# Patient Record
Sex: MALE | Race: WHITE
[De-identification: names, ages, dates, MRNs, and addresses within clinical notes are randomized per-mention and may not be internally consistent; named-entity substitution may affect disease eponyms.]

---

## 2020-03-13 ENCOUNTER — HOSPITAL ENCOUNTER (INPATIENT)
Dept: HOSPITAL 95 - ER | Age: 40
LOS: 11 days | Discharge: HOME | DRG: 917 | End: 2020-03-24
Attending: INTERNAL MEDICINE | Admitting: INTERNAL MEDICINE
Payer: SELF-PAY

## 2020-03-13 VITALS — HEIGHT: 75 IN | BODY MASS INDEX: 22.29 KG/M2 | WEIGHT: 179.24 LBS

## 2020-03-13 DIAGNOSIS — R56.9: ICD-10-CM

## 2020-03-13 DIAGNOSIS — G92: ICD-10-CM

## 2020-03-13 DIAGNOSIS — N17.9: ICD-10-CM

## 2020-03-13 DIAGNOSIS — J96.01: ICD-10-CM

## 2020-03-13 DIAGNOSIS — E87.6: ICD-10-CM

## 2020-03-13 DIAGNOSIS — I48.20: ICD-10-CM

## 2020-03-13 DIAGNOSIS — J69.0: ICD-10-CM

## 2020-03-13 DIAGNOSIS — J90: ICD-10-CM

## 2020-03-13 DIAGNOSIS — E83.39: ICD-10-CM

## 2020-03-13 DIAGNOSIS — J93.12: ICD-10-CM

## 2020-03-13 DIAGNOSIS — M62.82: ICD-10-CM

## 2020-03-13 DIAGNOSIS — R40.20: ICD-10-CM

## 2020-03-13 DIAGNOSIS — T40.7X1A: ICD-10-CM

## 2020-03-13 DIAGNOSIS — F10.20: ICD-10-CM

## 2020-03-13 DIAGNOSIS — T43.621A: Primary | ICD-10-CM

## 2020-03-13 DIAGNOSIS — I21.A1: ICD-10-CM

## 2020-03-13 DIAGNOSIS — J96.02: ICD-10-CM

## 2020-03-13 LAB
AMPHETAMINES UR SCN-MCNC: DETECTED NG/ML
AMPHETAMINES UR-MCNC: DETECTED UG/L
BASOPHILS # BLD AUTO: 0.12 K/MM3 (ref 0–0.23)
BASOPHILS NFR BLD AUTO: 1 % (ref 0–2)
CANNABINOIDS UR QL: DETECTED
DEPRECATED RDW RBC AUTO: 46.3 FL (ref 35.1–46.3)
EOSINOPHIL # BLD AUTO: 0.29 K/MM3 (ref 0–0.68)
EOSINOPHIL NFR BLD AUTO: 2 % (ref 0–6)
ERYTHROCYTE [DISTWIDTH] IN BLOOD BY AUTOMATED COUNT: 13.7 % (ref 11.7–14.2)
HCT VFR BLD AUTO: 45.4 % (ref 37–53)
HGB BLD-MCNC: 14.2 G/DL (ref 13.5–17.5)
IMM GRANULOCYTES # BLD AUTO: 0.48 K/MM3 (ref 0–0.1)
IMM GRANULOCYTES NFR BLD AUTO: 3 % (ref 0–1)
KETONES UR STRIP-MCNC: (no result) MG/DL
LEUKOCYTE ESTERASE UR QL STRIP: (no result)
LYMPHOCYTES # BLD AUTO: 6.2 K/MM3 (ref 0.84–5.2)
LYMPHOCYTES NFR BLD AUTO: 42 % (ref 21–46)
MCHC RBC AUTO-ENTMCNC: 31.3 G/DL (ref 31.5–36.5)
MCV RBC AUTO: 91 FL (ref 80–100)
MONOCYTES # BLD AUTO: 1.02 K/MM3 (ref 0.16–1.47)
MONOCYTES NFR BLD AUTO: 7 % (ref 4–13)
NEUTROPHILS # BLD AUTO: 6.64 K/MM3 (ref 1.96–9.15)
NEUTROPHILS NFR BLD AUTO: 45 % (ref 41–73)
NRBC # BLD AUTO: 0 K/MM3 (ref 0–0.02)
NRBC BLD AUTO-RTO: 0 /100 WBC (ref 0–0.2)
PH BLDA: 6.99 [PH] (ref 7.35–7.45)
PLATELET # BLD AUTO: 371 K/MM3 (ref 150–400)
PROT UR STRIP-MCNC: (no result) MG/DL
SP GR SPEC: 1.02 (ref 1–1.02)
UROBILINOGEN UR STRIP-MCNC: (no result) MG/DL
WBC #/AREA URNS HPF: (no result) /HPF (ref 0–5)

## 2020-03-13 PROCEDURE — U0001 2019-NCOV DIAGNOSTIC P: HCPCS

## 2020-03-13 PROCEDURE — G0480 DRUG TEST DEF 1-7 CLASSES: HCPCS

## 2020-03-13 PROCEDURE — C1751 CATH, INF, PER/CENT/MIDLINE: HCPCS

## 2020-03-13 PROCEDURE — C9113 INJ PANTOPRAZOLE SODIUM, VIA: HCPCS

## 2020-03-14 LAB
ALBUMIN SERPL BCP-MCNC: 3.5 G/DL (ref 3.4–5)
ALBUMIN/GLOB SERPL: 1.1 {RATIO} (ref 0.8–1.8)
ALT SERPL W P-5'-P-CCNC: 39 U/L (ref 12–78)
ANION GAP SERPL CALCULATED.4IONS-SCNC: 14 MMOL/L (ref 6–16)
APAP SERPL-MCNC: <2 UG/ML (ref 10–30)
AST SERPL W P-5'-P-CCNC: 44 U/L (ref 12–37)
BASOPHILS # BLD AUTO: 0.05 K/MM3 (ref 0–0.23)
BASOPHILS NFR BLD AUTO: 0 % (ref 0–2)
BILIRUB SERPL-MCNC: 0.2 MG/DL (ref 0.1–1)
BUN SERPL-MCNC: 15 MG/DL (ref 8–24)
CALCIUM SERPL-MCNC: 7.6 MG/DL (ref 8.5–10.1)
CHLORIDE SERPL-SCNC: 110 MMOL/L (ref 98–108)
CK MB CFR SERPL CALC: 0.4 % (ref 0–4)
CK MB CFR SERPL CALC: 1 % (ref 0–4)
CK SERPL-CCNC: 2833 U/L (ref 39–308)
CK SERPL-CCNC: 395 U/L (ref 39–308)
CO2 SERPL-SCNC: 17 MMOL/L (ref 21–32)
CREAT SERPL-MCNC: 1.31 MG/DL (ref 0.6–1.2)
DEPRECATED RDW RBC AUTO: 45.6 FL (ref 35.1–46.3)
EOSINOPHIL # BLD AUTO: 0.12 K/MM3 (ref 0–0.68)
EOSINOPHIL NFR BLD AUTO: 1 % (ref 0–6)
ERYTHROCYTE [DISTWIDTH] IN BLOOD BY AUTOMATED COUNT: 14 % (ref 11.7–14.2)
ETHANOL SERPL-MCNC: 37 MG/DL
GLOBULIN SER CALC-MCNC: 3.1 G/DL (ref 2.2–4)
GLUCOSE SERPL-MCNC: 247 MG/DL (ref 70–99)
HCT VFR BLD AUTO: 42.1 % (ref 37–53)
HGB BLD-MCNC: 13.9 G/DL (ref 13.5–17.5)
IMM GRANULOCYTES # BLD AUTO: 0.13 K/MM3 (ref 0–0.1)
IMM GRANULOCYTES NFR BLD AUTO: 1 % (ref 0–1)
LYMPHOCYTES # BLD AUTO: 2.87 K/MM3 (ref 0.84–5.2)
LYMPHOCYTES NFR BLD AUTO: 16 % (ref 21–46)
MAGNESIUM SERPL-MCNC: 1.9 MG/DL (ref 1.6–2.4)
MCHC RBC AUTO-ENTMCNC: 33 G/DL (ref 31.5–36.5)
MCV RBC AUTO: 88 FL (ref 80–100)
MONOCYTES # BLD AUTO: 1.37 K/MM3 (ref 0.16–1.47)
MONOCYTES NFR BLD AUTO: 8 % (ref 4–13)
NEUTROPHILS # BLD AUTO: 13.22 K/MM3 (ref 1.96–9.15)
NEUTROPHILS NFR BLD AUTO: 74 % (ref 41–73)
NRBC # BLD AUTO: 0 K/MM3 (ref 0–0.02)
NRBC BLD AUTO-RTO: 0 /100 WBC (ref 0–0.2)
PH BLDV: 7.38 [PH] (ref 7.34–7.37)
PLATELET # BLD AUTO: 263 K/MM3 (ref 150–400)
POTASSIUM SERPL-SCNC: 3.6 MMOL/L (ref 3.5–5.5)
PROT SERPL-MCNC: 6.6 G/DL (ref 6.4–8.2)
SALICYLATES SERPL-MCNC: 2.9 MG/DL (ref 2.8–20)
SODIUM SERPL-SCNC: 141 MMOL/L (ref 136–145)
TROPONIN I SERPL-MCNC: 0.07 NG/ML (ref 0–0.04)
TROPONIN I SERPL-MCNC: 0.65 NG/ML (ref 0–0.04)

## 2020-03-14 PROCEDURE — 5A1955Z RESPIRATORY VENTILATION, GREATER THAN 96 CONSECUTIVE HOURS: ICD-10-PCS | Performed by: HOSPITALIST

## 2020-03-14 PROCEDURE — 0BH17EZ INSERTION OF ENDOTRACHEAL AIRWAY INTO TRACHEA, VIA NATURAL OR ARTIFICIAL OPENING: ICD-10-PCS | Performed by: HOSPITALIST

## 2020-03-14 PROCEDURE — 02HV33Z INSERTION OF INFUSION DEVICE INTO SUPERIOR VENA CAVA, PERCUTANEOUS APPROACH: ICD-10-PCS | Performed by: HOSPITALIST

## 2020-03-14 NOTE — NUR
PT REQUIRING MORE SEDATION D/T INCREASED AGITATION. PROPOFOL INCREASED FROM
65MCG TO 75MCG. FENT GIVEN Q 2-3HRS. RECTAL TEMP PROBE PLACED. 98.2 DEGREES.
BP TRENDING UP WITH SOME MILD HTN.

## 2020-03-14 NOTE — NUR
PT SEDATED ON PROPOFOL AT 55MCG FOR MECH VENT. PT PARTIALLY OPENS EYES,
SOMEWHAT AGITATED, VENT STACKING. PT DOES NOT FOLLOW DIRECTIONS.  IV W
PROPOFOL FOUND TO BE LEAKING. NEW IV STARTED. FENT 50MCG GIVEN, PROPOFOL
REMAINS AT 55MCG; PT NOW ADEQUATELY SEDATED. BP, HEART RATE/RHYTHM STABLE.
SATS 100% ON 45% FIO2. PT FOUND TO BE HYPOTHERMIC AT 96.1 VIA HANDY CATH TEMP
PROBE. WARM BLANKETS PLACED. TEMP CONT TO DECREASE DOWN TO 95.9. BEAR HUGGER
PLACED. PT HAS VERY POOR URINE OUTPUT AT APPROX 11CC/HR. WILL UPDATE DR EDGAR.

## 2020-03-14 NOTE — NUR
20G IV TO PT'S LEFT WRIST STARTED IN ER/NOT CHARTED. THIS IV WAS FOUND TO BE
LEAKING. IV DC'D INTACT. IV TO PT'S RIGHT FOOT PLACED BY ED/NOT CHARTED. THIS
IV DC'D INTACT D/T LOCATION. 2 PIV STARTED W/O DIFFICULTY; PT TOW.

## 2020-03-14 NOTE — NUR
RESTLESSNESS/AGITATION
PT RESTLESS, COUGHING, BITING AT ETT, PULLING AGAINST BILAT WRIST RESTRAINTS,
DOES NOT REDIRECT WELL AT THIS TIME, PROPOFOL GTT INCREASED TO 90 MCG/KG/MIN
AT THIS TIME, WILL MONITOR.

## 2020-03-14 NOTE — NUR
PT NEW ADMIT THIS SHIFT, REMAINS INTUBATED AND SEDATED, PROPOFOL GTT HAS BEEN
SLOWLY DECREASED TO 55 MCG/KG/MIN AS OF THIS TIME, PT IS INCREASINGLY
RESPONSIVE TO NOXIOUS STIMULI, CONTINUES WITHOUT RESPONSE TO VERBAL STIMULI,
PUPILS REMAIN AT 1 WITH BRISK REACTION BILAT. HRR, CONTINUES IN SINUS, RATE
80S, PRESSURES MAINTAINING, SKIN REMAINS PWD, NO EDEMA. RASH TO TORSO
CONTINUES WITHOUT CHANGE. LUNGS REMAIN CLEAR THROUGHOUT, SATS MAINTAIN WITH
VENT SETTINGS UNCHANGED, RATE REMAINS HIGH TEENS LOW 20S. TEMP PROBE HANDY IN
PLACE, PT AFEBRILE SINCE ARRIVAL TO ICU, CLEAR YELLOW URINE DRAINING TO
GRAVITY ALTHOUGH SMALL AMOUNT OF SEDIMENT WAS NOTED IN TUBING WITH EMPTYING
DRAINAGE BAG FOR I&O THIS AM.

## 2020-03-14 NOTE — NUR
ASSUMED CARE OF PT, BEDSIDE REPORT RECEIVED. PT IS GRIMACING, RESTLESS IN BED,
PULLING AGAINST BILAT WRIST RESTRAINTS AND COUGHING AT THIS TIME. PROPOFOL GTT
AT 75 MCG/KG/MIN, FENTANYL 100 MCG IV ADMIN BY OFFGOING RN LEXI ARRIOLA. PT
REMAINS INTUBATED WITH 8.0 ETT, 26 CM AT TEETH, VENT IS SET ON SPONTANEOUS,
RESP RATE NEAR 20 AT THIS TIME, TIDAL VOLUMES ARE 5-600, SATS ARE MID TO UPPER
90S, RT AT BEDSIDE FOR VENT CHECK. HRR, SINUS ON MONITOR WITH RATE IN THE
70-80S, PRESSURES ARE MAINTAINING AT THIS TIME, SKIN IS PWD, PEDAL PULSES ARE
FULL AND EQUAL BILAT, NO EDEMA IS NOTED. HANDY CATH IN PLACE, NO LONGER TEMP
PROBE, URINE IS DARK YELLOW IN UROMETER, APPROX 30 ML PRESENT AT THIS TIME.

## 2020-03-14 NOTE — NUR
PT ARRIVES TO ICU 4 VIA GURNEY FROM ER. HE IS SEDATED WITH PROPOFOL AT 70
MCG/KG/MIN TO LEFT FOREARM IV ACCESS VIA INFUSION PUMP, PROPOFOL IS PUT TO
STANDBY, CLAMPED, AND REMOVED FROM ER GURNEY INFUSION PUMP FOR SLIDE TRANSFER
TO BED.  SOFT RESTRAINTS ARE NOTED IN PLACE ON ARRIVAL FROM ER. ETT 8.0, 26 AT
LIP, 25 AT TEETH AT THIS TIME, RT AT BEDSIDE FOR VENT MANAGEMENT, AC 16, TV
500, FIO2 45%, PEEP 5, PT SATS ARE HIGH 90S WITH THESE SETTINGS, LUNGS ARE
CLEAR THROUGHOUT AT THIS TIME, RATE 20. HRR, SINUS ON MONITOR, RATE 80S,
PRESSURES IMPROVED FROM REPORTED AT ARRIVAL TO ER, SKIN IS PWD, BRISK CAP
REFILL, FINE RASH IS NOTED TO TORSO, NO EDEMA IS NOTED AT THIS TIME, DEFIB
PADS ARE NOTED IN PLACE, PER RT AT BEDSIDE, PT WAS SHOCKED X 2 IN ER FOR
CARDIOVERSION. HE IS NOTED TO HAVE A COUGH AND SWALLOW REFLEX WITH PROPOFOL ON
STANDBY, MINIMAL RESPONSE TO DEEP STERNAL RUB, WILL TITRATE PROPOFOL DOWN AS
TOLERATED AND MONITOR. PUPILS ARE EQUAL BILAT, 1, BRISK BUT SLIGHT REACTION,
NO EYE MOVEMENT IS NOTED AT THIS TIME. OROGASTRIC TUBE IS PLACED AND PT
TOLERATED WELL, PLACEMENT CONFIRMED WITH RETURN OF GASTRIC CONTENTS AS WELL AS
AUSCULTATION OF AIR OVER LUQ WITH 60 ML AIR FLUSH, PLACED TO LIS, BROWN/CLEAR
DRAINAGE AT THIS TIME, ABD FLAT, ACTIVE BOWEL TONES X 4, NO REACTION TO
PALPATION, ABD SOFT. TEMP PROBE HANDY IN PLACE DRAINING CLEAR YELLOW URINE TO
GRAVITY. ORAL CARE PROVIDED, BLOOD IS NOTED IN PT'S MOUTH.

## 2020-03-14 NOTE — NUR
PT WITH NO URINE OUTPUT SINCE 1130; DR EDGAR NOTIFIED. UNABLE TO FLUSH
CATHETER D/T PLUG. CATHETER DC'D AND NEW 16F COUDE PLACED W/O DIFFICULTY.
350CC YELLOW URINE OUTPUT WITHIN 5MIN. URINE SEDIMENT IMPROVED. DR EDGAR
CHANGED VENT SETTINGS TO SPONT PS 2, 40% FIO2.  PT TOLERATING WELL. PT WOKE
UP, EYES OPENED, MOUTHED WORDS, WAS UNABLE TO FOLLOW DIRECTIONS. FENT IVP
GIVEN FOR AGITATION. PT SLEEPING NOW, APPEARS COMFORTABLE. PT'S MOTHER CALLED
TO UPDATE HER AS HE HAS NO CHILDREN AND SHE IS NEXT OF KIN; TALA NOTIFIED.
PT'S COUSIN WHOM IS LISTED AS EMERGENCY CONTACT AT BEDSIDE.

## 2020-03-14 NOTE — NUR
ASSESSMENT
PT IS RESTING QUIETLY AT THIS TIME, DOES OPEN EYES WITH ORAL CARE, HOWEVER
TOLERATES WELL. SATS ARE NOTED TO BE DECREASING TO HIGH 80S WITH VENT SETTINGS
UNCHANGED. LUNG SOUNDS ARE COARSE THROUGHOUT, ETT SUCTIONED, PRODUCTIVE OF
MODERATE AMOUNT OF THICK GREEN SPUTUM, DISCUSSED LUNG SOUNDS WITH RT VIA
VOCERA, LUNG SOUNDS WERE CLEAR AT THE TIME OF BEDSIDE REPORT. PT LUNG SOUNDS
RETURN TO CLEAR POST PT COUGH AND ETT SUCTIONING, SATS RETURN TO BASELINE.
ACTIVE BOWEL TONES ARE PRESENT AT THIS TIME, ABD SOFT, NO GUARDING NOTED WITH
PALPATION. RASH TO TORSO IS IMPROVED.

## 2020-03-15 LAB
ANION GAP SERPL CALCULATED.4IONS-SCNC: 6 MMOL/L (ref 6–16)
BASOPHILS # BLD AUTO: 0.03 K/MM3 (ref 0–0.23)
BASOPHILS NFR BLD AUTO: 0 % (ref 0–2)
BUN SERPL-MCNC: 15 MG/DL (ref 8–24)
CALCIUM SERPL-MCNC: 7.9 MG/DL (ref 8.5–10.1)
CHLORIDE SERPL-SCNC: 109 MMOL/L (ref 98–108)
CK SERPL-CCNC: 8067 U/L (ref 39–308)
CO2 SERPL-SCNC: 28 MMOL/L (ref 21–32)
CREAT SERPL-MCNC: 1.17 MG/DL (ref 0.6–1.2)
DEPRECATED RDW RBC AUTO: 48.8 FL (ref 35.1–46.3)
EOSINOPHIL # BLD AUTO: 0.14 K/MM3 (ref 0–0.68)
EOSINOPHIL NFR BLD AUTO: 2 % (ref 0–6)
ERYTHROCYTE [DISTWIDTH] IN BLOOD BY AUTOMATED COUNT: 14.5 % (ref 11.7–14.2)
GLUCOSE SERPL-MCNC: 76 MG/DL (ref 70–99)
HCT VFR BLD AUTO: 43.6 % (ref 37–53)
HGB BLD-MCNC: 13.5 G/DL (ref 13.5–17.5)
IMM GRANULOCYTES # BLD AUTO: 0.01 K/MM3 (ref 0–0.1)
IMM GRANULOCYTES NFR BLD AUTO: 0 % (ref 0–1)
LYMPHOCYTES # BLD AUTO: 1.66 K/MM3 (ref 0.84–5.2)
LYMPHOCYTES NFR BLD AUTO: 20 % (ref 21–46)
MCHC RBC AUTO-ENTMCNC: 31 G/DL (ref 31.5–36.5)
MCV RBC AUTO: 91 FL (ref 80–100)
MONOCYTES # BLD AUTO: 0.34 K/MM3 (ref 0.16–1.47)
MONOCYTES NFR BLD AUTO: 4 % (ref 4–13)
NEUTROPHILS # BLD AUTO: 6.01 K/MM3 (ref 1.96–9.15)
NEUTROPHILS NFR BLD AUTO: 73 % (ref 41–73)
NRBC # BLD AUTO: 0 K/MM3 (ref 0–0.02)
NRBC BLD AUTO-RTO: 0 /100 WBC (ref 0–0.2)
PH BLDA: 7.18 [PH] (ref 7.35–7.45)
PH BLDA: 7.24 [PH] (ref 7.35–7.45)
PH BLDA: 7.4 [PH] (ref 7.35–7.45)
PH BLDV: 7.28 [PH] (ref 7.34–7.37)
PLATELET # BLD AUTO: 220 K/MM3 (ref 150–400)
POTASSIUM SERPL-SCNC: 3.6 MMOL/L (ref 3.5–5.5)
SODIUM SERPL-SCNC: 143 MMOL/L (ref 136–145)

## 2020-03-15 NOTE — NUR
PT PLACED IN PRONE POSITION AT 1600. DR EDGAR AT BEDSIDE. PT TURNED WITH 4
RN'S, 2 AT EACH SIDE, AN RT AT THE HEAD MONITORING AIRWAY AND ETT, AND A RT
STUDENT TO ASSIST. PT PRONE W/O COMPLICATIONS. HEAD TURNED TOWARDS R SIDE
FACING VENT. SATS IMMEDIATELY RAISED TO MID 90'S THEN DROPPED. DR EDGAR
RAISED PEEP UP TO 16. SATS THEN RAISED TO HIGH 90'S. PEEP NOW DOWN TO 12, FIO2
AT 98%, SATS %. PICC PLACED PRIOR TO PRONING PT. PICC PLACED W/O
DIFFICULTY.  LEVOPHED STARTED AT 1647 AT 6MCG, AND HAS BEEN TITRATED DOWN TO
3MCG; WILL CONT TO TITRATE DOWN AS TOLERATED. TEMP NOW 98.6 PER RECTAL PROBE.
BIS READING 40-50. PT IN NSR, RATE AT 85. PROPOFOL AT 40MCG, NIMBEX AT 2MCG.

## 2020-03-15 NOTE — NUR
DR EDGAR UPDATED FAMILY. DR EDGAR INCREASED PEEP TO 10, AND DECREASED FIO2
TO 80%. PT SATS TEMP INCREASED TO 90-92%, BUT THEN FELL TO 86%. RT AT BEDSIDE
FIO2 INCREASED %. PT MAY REQUIRE NIMBEX. TYLENOL PT GIVEN FOR TEMP
102.2.

## 2020-03-15 NOTE — NUR
PT'S O2 SAT HAVE BEEN TRENDING DOWN BETWEEN 82 AND 90. DR EDGAR HAS BEEN IN
THE UNIT AND AT BEDSIDE OFTEN TO EVALUATE PT. STAT CHEST XRAY COMPLETED TO
RE-ASSESS SMALL PNEUMO; NO SIGNIFICANT CHANGES IN PNEUMOTHORAX, THERE ARE
INCREASING INFILTRATES IN BASES. PEEP INCREASED TO 14, FIO2 %. PT PLACED
FLAT IN PREP FOR REPOSITIONING AND SATS INCREASED TO 87-90. LUNGS SOUNDS HAVE
NOT CHANGED FROM THIS AM. AWAITING PCR. PT ADEQUATELY PARALYZED AND SEDATED.
BIS READING 40. TO4 0/0. PT HYPOTENSIVE WITH MAPS 50'S NOW THAT PEEP HAS
INCREASED. PICC LINE TO BE PLACED. IF SATS DO NOT IMPROVE WITHIN THE HOUR DR EDGAR MAY ORDER FOR PT TO BE PRONED. VANCO STARTED.

## 2020-03-15 NOTE — NUR
UPDATE
PT REPOSITIONED FROM PRONE TO SUPINE. BED BATH GIVEN. PT HYPOTENSIVE, LEVOPHED
RESTARTED. DR EDGAR CALLED AND UPDATED TO CURRENT CONDITION AND VBG. NO NEW
ORDERS RECIEVED. WILL CONTINUE TO MONITOR.

## 2020-03-15 NOTE — NUR
LEVOPHED DECREASED TO 2MCG. PT TURNED PRONE FACING RIGHT, 4RN'S, 1RT, AND DR EDGAR ASSISTED W TURN. FIO2 DOWN TO 70%, SATS 98%.

## 2020-03-15 NOTE — NUR
PT REMAINS INTUBATED, VENT REMAINS ON SPONT WITH PEEP AT 5, FIO2 TITRATED UP
TO 60% THROUGHOUT SHIFT SECONDARY TO PT SATS DECREASING TO MID 80S, COUGH
FREQUENCY HAS INCREASED, SPUTUM PRODUCTION HAS INCREASED, COLOR LIGHTENED FROM
GREEN TO PARNELL/GREEN, LUNGS INITIALLY COARSE BUT CLEARED WITH SUCTIONING
PROGRESSED TO MAINTAINING COARSE AFTER SUCTION. SEDATION REQUIREMENTS
INCREASED PROPOFOL AT 90 MCG/KG/MIN MOST OF THIS SHIFT, ATTEMPTS TO TITRATE
DOWN TO 85 REPEATEDLY RESULTED IN PT SITTING UP AND PULLING AGAINST BILAT
WRIST RESTRAINTS, DID NOT FOLLOW DIRECTIONS, FENTANYL 100 MCG IV REQUIRED
EVERY 1-2 HOURS IN ADDITION TO PROPOFOL, DISCUSSED SEDATION REQUIREMENTS WITH
DR EDGAR THIS AM AND ORDERS RECEIVED FOR PRECEDEX AND ATIVAN AND TO KEEP
PROPOFOL LESS THAN 60 MCG/KG/MIN, PRECEDEX INFUSION STARTED AT 0.2 MCG/KG/HR
AND TITRATED TO 0.4 MCG/KG/HR PT IS TOLERATING WELL AS OF THIS TIME. TEMP
INCREASED TO MAX .4, PT HAS ICE TO BILAT AXILLA, RIGHT GROIN, AND RIGHT
CLAVICLE AS WELL AS TYLENOL ADMIN MD AT 0358 THIS AM, TEMP HAS IMPROVED TO
103.1 AS OF THIS TIME. URINE CLEARED THROUGHOUT SHIFT, WAS GREEN TINGED FROM
MIDNOC UNTIL 0500 THIS AM, HAS BEEN CLEAR YELLOW SINCE THAT TIME.

## 2020-03-15 NOTE — NUR
BP STABLE; PROPOFOL RESTARTED AT 40MCG. RESP RATE AND RESP EFFORT SLOWLY
DECREASING, SATS STARTING TO INCREASE.

## 2020-03-15 NOTE — NUR
ELEVATED TEMP
RECTAL PROBE TEMP NOTED 102.9, ICE PACK PLACED TO GROIN AND RIGHT ANTERIOR
CLAVICLE, FAN PLACED ON BEDSIDE TABLE AND SET ON HIGH. CALL PLACED TO
HOSPITALIST ON CALL REGARDING INCREASED TEMP. TYLENOL ORDERS RECEIVED.

## 2020-03-15 NOTE — NUR
ELEVATED TEMP
TEMP INCREASED .8, TYLENOL AR AVAILABLE FROM PHARMACY AND ADMIN, ADD ICE
PACKS PLACED TO AXILLA BILAT. WILL CONT TO MONITOR.

## 2020-03-15 NOTE — NUR
ABG SHOWS HYPOXIA, PT'S SATS 96%, PT USING ACCESSORY MUSCLES. DR AGUILAR
NOTIFIED. ORDERS GIVEN TO START NIMBEX GTT.

## 2020-03-15 NOTE — NUR
SPOKE WITH DR EDGAR REGARDING PROPOFOL AND FENTANYL REQUIREMENTS FOR SEDATION
THIS SHIFT, PT ELEVATED TEMP, AND SPUTUM SUCTIONED FROM ETT. ORDERS RECEIVED.
WILL INITIATE PRECEDEX ON ARRIVAL FROM PHARMACY AND TITRATE PROPOFOL PER
ORDERS.

## 2020-03-15 NOTE — NUR
ASSUMED CARE
BEDSIDE REPORT RECIEVED. PT IS INTUBATED, SEDATED, AND PARALYZED. PT IN PRONE
POSITION AT THIS TIME. VENT SETTINGS AC 20, , PEEP 12, FIO2 70%. PT WITH
COPIOUS ORAL AND ETT SECRETIONS. OGT IN PLACE TO LIS, SCANT BROWN OUTPUT
NOTED. PICC TO JUNE C/D/I. PROPOFOL INFUSING AT 40 MCG/KG/MIN INITIALLY,
TITRATED UP TO 50 MCG/KG/MIN, NIMBEX AT 2 MCG/KG/MIN, AND NS AT 75 ML/HR. BIS
MONITORING IN PLACE READING 40-60'S. TRAIN OF FOUR IS 4/4. PT FURROWED BROW
WITH ORAL SUCTION. NO SPONTANEOUS MOVEMENT OF EXTREMITIES NOTED. NO RESTRAINTS
IN PLACE. HANDY IN PLACE WITH YELLOW/SEDIMENT OUTPUT NOTED. RECTAL THERMOMITER
IN PLACE. FAMILY AT BEDSIDE UPDATED TO PLAN OF CARE AND PT CONDITION. WILL
CONTINUE TO MONITOR.

## 2020-03-15 NOTE — NUR
NIMBEX STARTED AT 2MCG/KG/MIN. PROPOFOL PLACED ON STANDBY FOR HYPOTENSION. BIS
MONITOR PLACED AND SHOWS BASELINE 70'S. TO4: 4/4. DR EDGAR AT BEDSIDE TO
CHECK ON PT. TEMP 101.8; WILL GIVE IBUPROFEN AS ORDERED.

## 2020-03-15 NOTE — NUR
PT SEDATED ON PROPOFOL AT 60MCG AND PRECEDEX AT 0.4MCG FOR MECH VENT. BEDSIDE
REPORT TAKEN. TEMP 102.2 PER RECTAL PROBE. NEW ICE PACKS PLACED TO PT, FAN ON
PT, WET TOWEL TO CHEST. PT BECAME VERY AGITATED, NOT FOLLOWING DIRECTIONS,
VENT STACKING, SATS LOW 80%. FENT/ATIVAN GIVEN W GOOD EFFECT. PROPOFOL
DECREASED TO 50MCG AND PRECEDEX INCREASED TO 0.7MCG. PT SATS IMPROVED TO HIGH
80'S. RT CALLED. PT SUCTIONED; NO SPUTUM AT THIS TIME, BUT DID HAVE COPIOUS
AMTS OF THICK YELLOW/CREAM SPUTUM LAST NOCT PER NIGHT RN. DR EDGAR CALLED;
PEEP INCREASED TO 8, PT PLACED BACK ON AC16//FIO2 %. LUNGS VERY
DIMINISHED TO BASES, COARSE TO JUSTYN. PTS SATS NOW 94-95%. PT'S MOM CALLED AND
GIVEN UPDATE.

## 2020-03-16 LAB
ANION GAP SERPL CALCULATED.4IONS-SCNC: 6 MMOL/L (ref 6–16)
BASOPHILS # BLD: 0.09 K/MM3 (ref 0–0.23)
BASOPHILS NFR BLD: 1 % (ref 0–2)
BUN SERPL-MCNC: 15 MG/DL (ref 8–24)
CALCIUM SERPL-MCNC: 7.6 MG/DL (ref 8.5–10.1)
CHLORIDE SERPL-SCNC: 110 MMOL/L (ref 98–108)
CO2 SERPL-SCNC: 25 MMOL/L (ref 21–32)
CREAT SERPL-MCNC: 0.77 MG/DL (ref 0.6–1.2)
DEPRECATED RDW RBC AUTO: 47.2 FL (ref 35.1–46.3)
EOSINOPHIL # BLD: 0 K/MM3 (ref 0–0.68)
EOSINOPHIL NFR BLD: 0 % (ref 0–6)
ERYTHROCYTE [DISTWIDTH] IN BLOOD BY AUTOMATED COUNT: 14.1 % (ref 11.7–14.2)
GLUCOSE SERPL-MCNC: 104 MG/DL (ref 70–99)
HCT VFR BLD AUTO: 43.1 % (ref 37–53)
HGB BLD-MCNC: 13.7 G/DL (ref 13.5–17.5)
LYMPHOCYTES # BLD: 1.82 K/MM3 (ref 0.84–5.2)
LYMPHOCYTES NFR BLD: 19 % (ref 21–46)
MAGNESIUM SERPL-MCNC: 1.9 MG/DL (ref 1.6–2.4)
MCHC RBC AUTO-ENTMCNC: 31.8 G/DL (ref 31.5–36.5)
MCV RBC AUTO: 90 FL (ref 80–100)
METAMYELOCYTES # BLD MANUAL: 0.09 K/MM3 (ref 0–0)
METAMYELOCYTES NFR BLD MANUAL: 1 % (ref 0–0)
MONOCYTES # BLD: 0.19 K/MM3 (ref 0.16–1.47)
MONOCYTES NFR BLD: 2 % (ref 4–13)
NEUTS BAND NFR BLD MANUAL: 42 % (ref 0–8)
NEUTS SEG # BLD MANUAL: 7.4 K/MM3 (ref 1.96–9.15)
NEUTS SEG NFR BLD MANUAL: 35 % (ref 41–73)
NRBC # BLD AUTO: 0 K/MM3 (ref 0–0.02)
NRBC BLD AUTO-RTO: 0 /100 WBC (ref 0–0.2)
PH BLDA: 7.27 [PH] (ref 7.35–7.45)
PHOSPHATE SERPL-MCNC: 3.7 MG/DL (ref 2.5–4.9)
PLATELET # BLD AUTO: 224 K/MM3 (ref 150–400)
POTASSIUM SERPL-SCNC: 4.3 MMOL/L (ref 3.5–5.5)
SODIUM SERPL-SCNC: 141 MMOL/L (ref 136–145)
TOTAL CELLS COUNTED BLD: 100

## 2020-03-16 NOTE — NUR
PATIENT INTUBATED AND SEDATED WITH VENT SET AT AC 20, , PEEP 10, FIO2
45% PROPOFOL AT 6OMCG AND NIMBEX 3MCG WITH BIS 31 AND TRAIN OF 4 4/4 TO RIGHT
EYEBROW. DURING ORAL CARE PATIENT RAISING EYEBROWS, NO OTHER MOVEMENT SEEN.
OG IN PLACE WITH NO RESIDUAL RATE INCREASED TO GOAL RATE OF 35 CC/HR PLAN TO
CONTINUE TO MONITOR FOR RESIDUALS.

## 2020-03-16 NOTE — NUR
TEMP 101.1. . /97. BIS 30 WITH NIMBEX @ 3 MCG/KG/MIN AND PROPOFOL
@ 60 MCG/KG/MIN. MED WITH FENTANYL 50 MCG IVP X 1.  PT INCONTINENT OF
STOOL-KIP CARE AND PARTIAL LINEN CHANGE COMPLETED.

## 2020-03-16 NOTE — NUR
SHIFT SUMMARY
NO ACUTE CHANGES THIS SHIFT. PT REMAINS INTUBATED, SEDATED, AND PARALYZED.
VENT SETTINGS AC 20, , PEEP 12, FIO2 60%. PT WITH MINIMAL SECRETIONS
AFTER POSITIONING BACK TO SUPINE. VITAL SIGNS HAVE REMAINED STABLE. PICC TO
JUNE C/D/I. PROPOFOL INFUSING AT 50 MCG/KG/MIN, NIMBEX CURRENTLY AT 2
MCG/KG/MIN, DOWN FROM 3 MCG/KG/MIN. NS AT 75 ML/HR. LEVOPHED CURRENTLY ON
STANDBY. BIS MONITORING IN PLACE, PT REMAINED BETWEEN 40-60 ON BIS MONITOR.
TRAIN OF FOUR HAS REMAINED 4/4. OGT IN PLACE, CLAMMPED. HANDY IN PLACE WITH
DARK TEA COLORED OUTPUT. NO FAMILY AT BEDSIDE. WILL CONTINUE TO MONITOR AND
REPORT OFF TO ONCOMING RN.

## 2020-03-16 NOTE — NUR
BIS 42. HR 90'S SR. TEMP 100.8. PT REPOSITIONED TO LEFT SIDE. SATS 91% PRIOR
TO REPOSITIONING-INCREASED TO 95% ONCE PT ON LEFT SIDE.

## 2020-03-16 NOTE — NUR
SATS 88-90%. PT SUCTIONED-LARGE AMOUNT OF THICK,PALE YELLOW SPUTUM. FIO2
TITRATED UP TO 50% TO KEEP SATS>90%

## 2020-03-16 NOTE — NUR
ASSUMED PT CARE. PT REMAINS INTUBATED, SEDATED AND PARALYZED. BIS 40-60. TO4
4/4 @ 0715. PT GRIMACED WITH PUPIL CHECK. NIMBEX DRIP TITRATED UP TO 2.5
MCG/KG/MIN. PROPOFOL DRIP CONTINUES @ 50 MCG/KG/MIN. ECG SHOWS SR TO ST
'S. SBP TRENDING 130'S-LEVOPHED DRIP ON STANDBY @ 0715. TEMP 100.2. ETT
8.0/26 @ TEETH. VENT: AC 20, , PEEP 12, FIO2 60%. SATS>95% LUNGS CLEAR.
OGT CONFIRMED PLACEMENT WITH AUSCULTATION. PIVOT 1.5 INITIATED @ 10 CC/HR WITH
H20 @ 30ML FLUSH EVERY 4 HOURS. HANDY TO BSD WITH ADEQUATE AMOUNT OF DARK,
YELLOW URINE OUTPUT.

## 2020-03-16 NOTE — NUR
PT REMAINS INTUBATED, SEDATED, AND PARALYZED. BIS 30, TO4 4/4 AND PT HR AND BP
TRENDING UP- TEMP 100.6.  INCREASED NIMBEX TO 3 MGC/KG/MIN AND PROPOFOL TO 60
MCG/KG/MIN. RR 24-MAINTAINING SATS>90% ON FIO2 50%-PEEP REMAINS AT 10.
INSPIRATORTY WZ AUSCULTATED TO LEFT LUNG MOORE. BT'S HYPERACTIVE. RESIDUAL
CHECK 10 CC REFED AND TF INCREASED TO 25 CC/R. HANDY DRAINING ADEQUATE AMOUNT
OF TEA COLORED URINE.

## 2020-03-16 NOTE — NUR
DR. GODOY UPDATED TO PT I&O AND CURRENT VS. MD AWARE THAT SBP TRENDING 180'S
AND THAT PT HAS SCLERAL EDEMA. ORDER GIVEN FOR LABETOLOL PRN-SEE EMAR.

## 2020-03-17 LAB
ALBUMIN SERPL BCP-MCNC: 2.3 G/DL (ref 3.4–5)
ALBUMIN/GLOB SERPL: 0.6 {RATIO} (ref 0.8–1.8)
ALT SERPL W P-5'-P-CCNC: 89 U/L (ref 12–78)
ANION GAP SERPL CALCULATED.4IONS-SCNC: 4 MMOL/L (ref 6–16)
AST SERPL W P-5'-P-CCNC: 65 U/L (ref 12–37)
BASOPHILS # BLD: 0 K/MM3 (ref 0–0.23)
BASOPHILS NFR BLD: 0 % (ref 0–2)
BILIRUB SERPL-MCNC: 0.2 MG/DL (ref 0.1–1)
BUN SERPL-MCNC: 11 MG/DL (ref 8–24)
CALCIUM SERPL-MCNC: 8.3 MG/DL (ref 8.5–10.1)
CHLORIDE SERPL-SCNC: 109 MMOL/L (ref 98–108)
CO2 SERPL-SCNC: 28 MMOL/L (ref 21–32)
CREAT SERPL-MCNC: 0.72 MG/DL (ref 0.6–1.2)
DEPRECATED RDW RBC AUTO: 48.5 FL (ref 35.1–46.3)
EOSINOPHIL # BLD: 0 K/MM3 (ref 0–0.68)
EOSINOPHIL NFR BLD: 0 % (ref 0–6)
ERYTHROCYTE [DISTWIDTH] IN BLOOD BY AUTOMATED COUNT: 14.4 % (ref 11.7–14.2)
GLOBULIN SER CALC-MCNC: 3.8 G/DL (ref 2.2–4)
GLUCOSE SERPL-MCNC: 122 MG/DL (ref 70–99)
HCT VFR BLD AUTO: 40.1 % (ref 37–53)
HGB BLD-MCNC: 12.8 G/DL (ref 13.5–17.5)
LYMPHOCYTES # BLD: 1.09 K/MM3 (ref 0.84–5.2)
LYMPHOCYTES NFR BLD: 11 % (ref 21–46)
MAGNESIUM SERPL-MCNC: 2.2 MG/DL (ref 1.6–2.4)
MCHC RBC AUTO-ENTMCNC: 31.9 G/DL (ref 31.5–36.5)
MCV RBC AUTO: 90 FL (ref 80–100)
MONOCYTES # BLD: 0.09 K/MM3 (ref 0.16–1.47)
MONOCYTES NFR BLD: 1 % (ref 4–13)
NEUTS BAND NFR BLD MANUAL: 10 % (ref 0–8)
NEUTS SEG # BLD MANUAL: 8.73 K/MM3 (ref 1.96–9.15)
NEUTS SEG NFR BLD MANUAL: 78 % (ref 41–73)
NRBC # BLD AUTO: 0 K/MM3 (ref 0–0.02)
NRBC BLD AUTO-RTO: 0 /100 WBC (ref 0–0.2)
PH BLDA: 7.35 [PH] (ref 7.35–7.45)
PHOSPHATE SERPL-MCNC: 1.5 MG/DL (ref 2.5–4.9)
PHOSPHATE SERPL-MCNC: 1.7 MG/DL (ref 2.5–4.9)
PLATELET # BLD AUTO: 226 K/MM3 (ref 150–400)
POTASSIUM SERPL-SCNC: 3.4 MMOL/L (ref 3.5–5.5)
POTASSIUM SERPL-SCNC: 4.2 MMOL/L (ref 3.5–5.5)
PROT SERPL-MCNC: 6.1 G/DL (ref 6.4–8.2)
SODIUM SERPL-SCNC: 141 MMOL/L (ref 136–145)
SODIUM SERPL-SCNC: 144 MMOL/L (ref 136–145)
TOTAL CELLS COUNTED BLD: 100
VANCOMYCIN TROUGH SERPL-MCNC: 7.6 UG/ML (ref 5–10)

## 2020-03-17 NOTE — NUR
PT HAS REMAINED SEDATED AND INTUBATED.MAINTAINING SATS>90% ON FIO2 55% AND
PEEP 10.  MED WITH FENTANYL 50 MCG IVP X1 FOR SEDATION ADJUNT-OTHERWISE, HAS
TOLERATED THE DISCONTINUATION OF THE NIMBEX WELL. TEMP TRENDING UP THIS
EVENING. PT HAS HAD IMPROVED URINE OUPUT TODAY.

## 2020-03-17 NOTE — NUR
ASSUME CARE: BEDSIDE REPORT RECIEVED FROM OFF GOING RN ROMERO. MONITOR INTACT
SHOWING SINUS RHYTHM HEART RATE 80'S VENT SETTINGS REMAIN AC 20, , FIO2
55% PEEP10, RATE 20-24, SPO2 96-9-100% COPIOUS AMT THIN CLEAR ORAL SECREATIONS
AND MODERATE AMT THICK YELLOWISH SECREATIONS SX PER ETT WITH ORAL CARE. SCANT
AMT LESS THWN 2ML RESIDUAL REFED. LUNG SOUNDS CLEAR/COARSE RHOINCHI UPPER
LOBES WITH DECREASED BASES. ABDOMEN SOFT WITH BOWEL SOUNDS FOUR QUADS. HANDY
PATENT DRAINING IFTIKHAR URINE. REMAINS IN SOFT WRIST RESTRAINTS BILATERLY.
CONTINUE TO MONITOR AND REPORT CHANGE IN PATIENT CONDITION

## 2020-03-17 NOTE — NUR
PHOS 1.7 ADDITIONAL NA PHOS REPLACEMENT ORDERED PER ELECTROLYTE PROTOCOL. WILL
REPEAT PHOS 2 HOURS AFTER REPLACEMENT COMPLETE. PT MAINTAINS SATS 92% WITH
FIO2 55% AND PEEP REMAINS AT 10. NIMBEX DISCONTINUED BY DR. GODOY.

## 2020-03-17 NOTE — NUR
PT REPOSITIONED SUPINE WITH HOB ELEVATED AT 30 DEGREES. SATS DOWN TO
85%.SUCTION NON-PRODUCTIVE AND SATS UNCHANGED. FOO2 TITRATED UP TO 55% TO KEEP
SATS>90%. VALENTE FROM RT NOTIFIED.

## 2020-03-17 NOTE — NUR
SBP TRENDING >160. ADDITIONAL DOSE OF LABATOLOL 20 MG IVP X 1 GIVEN. BIS NOW
45 WITH PROPOFOL @ 55 MCG/KG/MIN AND NIMBEX @ 3 MCG/KG/MIN.

## 2020-03-17 NOTE — NUR
PT REMAINS INTUBATED, SEDATED, AND PARALYZED. BIS 60-65 ON NIMBEX @ 3
MCG/KG/MIN AND PROPOFOL @ 55 MCG/KG/MIN. TO4 4/4. MED WITH FENTANYL 50 MCG IVP
X 1 AS SEDATION ADJUNCT. TEMP 100.8-MED WITH TYLENOL VIA OGT-SEE EMAR. SBP
TRENDING >160-MED WITH LABETOLOL 20 MG IVPX1-SEE EMAR. SCLERAL EDEMA
CONTINUES. URINE OUTPUT IMPROVED OVER NIGHT-350 CC OF DARK, YELLOW URINE TO
UROMETER. ETT REMAINS TO VENT: AC 20, , PEEP 10, FIO2 45%-SATS >90%
LUNGS DIMINISHED T/O LEFT LUNG MOORE, SCATTERED COARSE RHONCHI ON THE RIGHT.
SUCTION PRODUCTIVE OF LARGE AOUNT OF THICK, PALE, YELLOW SPUTUM. MINIMAL
RESIDUAL FROM OGTF-TOLERATING PIVOT 1.5 @ GOAL RATE WELL. NA PHOS RIDER
INFUSING PER ELECTROLYTE PROTOCOL. WILL RE-CHECK PHOS 2 HOURS AFTER
REPLACEMENT IS COMPLETE.

## 2020-03-17 NOTE — NUR
PT REMAINS INTUBATED, SEDATED, AND PARALYZED.BIS 50. TO4 4/4. PUPILS 2 MM
BILATERALLY AND EQUALLY REACTIVE TO LIGHT. TEMP 100.4 -160'S. RHYTHM
CONTINUES SR. LUNGS WITH BETTER AIR EXCHANGE AUSCULTATED. SCATTERED COARSE
RHONCHI THROUGH OUT. MAINTAINS SATS>95% WITH PEEP 8 AND FIO2 45%. FEWER THICK,
NOW WHITE SECRETIONS FROM ETT. ORAL SECRETIONS MODERATE AMOUNT OF THICK, BLOOD
TINGED SECRETIONS. URINE OUTPUT 1200 CC SO FAR THIS SHIFT.

## 2020-03-17 NOTE — NUR
SERUM PHOSPHORUS SENT 2 HOURS POST NAPHOS REPLACEMENT PER ELECTROLYTE
PROTOCOL. COVID 19 SEND OUT RESULT "UNDETECTED" PER LAB. ISOLATION REMOVED PER
INFECTIOUS DISEASE/CONTROL.

## 2020-03-18 LAB
ALBUMIN SERPL BCP-MCNC: 2.1 G/DL (ref 3.4–5)
ALBUMIN SERPL BCP-MCNC: 2.1 G/DL (ref 3.4–5)
ALBUMIN/GLOB SERPL: 0.6 {RATIO} (ref 0.8–1.8)
ALBUMIN/GLOB SERPL: 0.6 {RATIO} (ref 0.8–1.8)
ALT SERPL W P-5'-P-CCNC: 73 U/L (ref 12–78)
ALT SERPL W P-5'-P-CCNC: 73 U/L (ref 12–78)
ANION GAP SERPL CALCULATED.4IONS-SCNC: 3 MMOL/L (ref 6–16)
ANION GAP SERPL CALCULATED.4IONS-SCNC: 5 MMOL/L (ref 6–16)
AST SERPL W P-5'-P-CCNC: 39 U/L (ref 12–37)
AST SERPL W P-5'-P-CCNC: 51 U/L (ref 12–37)
BASOPHILS # BLD AUTO: 0.03 K/MM3 (ref 0–0.23)
BASOPHILS # BLD AUTO: 0.03 K/MM3 (ref 0–0.23)
BASOPHILS NFR BLD AUTO: 0 % (ref 0–2)
BASOPHILS NFR BLD AUTO: 0 % (ref 0–2)
BILIRUB SERPL-MCNC: 0.2 MG/DL (ref 0.1–1)
BILIRUB SERPL-MCNC: 0.4 MG/DL (ref 0.1–1)
BUN SERPL-MCNC: 10 MG/DL (ref 8–24)
BUN SERPL-MCNC: 11 MG/DL (ref 8–24)
CALCIUM SERPL-MCNC: 7.9 MG/DL (ref 8.5–10.1)
CALCIUM SERPL-MCNC: 8 MG/DL (ref 8.5–10.1)
CHLORIDE SERPL-SCNC: 108 MMOL/L (ref 98–108)
CHLORIDE SERPL-SCNC: 109 MMOL/L (ref 98–108)
CO2 SERPL-SCNC: 30 MMOL/L (ref 21–32)
CO2 SERPL-SCNC: 32 MMOL/L (ref 21–32)
CREAT SERPL-MCNC: 0.62 MG/DL (ref 0.6–1.2)
CREAT SERPL-MCNC: 0.68 MG/DL (ref 0.6–1.2)
DEPRECATED RDW RBC AUTO: 45.6 FL (ref 35.1–46.3)
DEPRECATED RDW RBC AUTO: 49.1 FL (ref 35.1–46.3)
EOSINOPHIL # BLD AUTO: 0.17 K/MM3 (ref 0–0.68)
EOSINOPHIL # BLD AUTO: 0.24 K/MM3 (ref 0–0.68)
EOSINOPHIL NFR BLD AUTO: 2 % (ref 0–6)
EOSINOPHIL NFR BLD AUTO: 3 % (ref 0–6)
ERYTHROCYTE [DISTWIDTH] IN BLOOD BY AUTOMATED COUNT: 14.2 % (ref 11.7–14.2)
ERYTHROCYTE [DISTWIDTH] IN BLOOD BY AUTOMATED COUNT: 14.6 % (ref 11.7–14.2)
GLOBULIN SER CALC-MCNC: 3.5 G/DL (ref 2.2–4)
GLOBULIN SER CALC-MCNC: 3.5 G/DL (ref 2.2–4)
GLUCOSE SERPL-MCNC: 109 MG/DL (ref 70–99)
GLUCOSE SERPL-MCNC: 118 MG/DL (ref 70–99)
HCT VFR BLD AUTO: 32.5 % (ref 37–53)
HCT VFR BLD AUTO: 35.4 % (ref 37–53)
HGB BLD-MCNC: 10.5 G/DL (ref 13.5–17.5)
HGB BLD-MCNC: 11.4 G/DL (ref 13.5–17.5)
IMM GRANULOCYTES # BLD AUTO: 0.06 K/MM3 (ref 0–0.1)
IMM GRANULOCYTES # BLD AUTO: 0.11 K/MM3 (ref 0–0.1)
IMM GRANULOCYTES NFR BLD AUTO: 1 % (ref 0–1)
IMM GRANULOCYTES NFR BLD AUTO: 1 % (ref 0–1)
LYMPHOCYTES # BLD AUTO: 0.91 K/MM3 (ref 0.84–5.2)
LYMPHOCYTES # BLD AUTO: 1.25 K/MM3 (ref 0.84–5.2)
LYMPHOCYTES NFR BLD AUTO: 14 % (ref 21–46)
LYMPHOCYTES NFR BLD AUTO: 9 % (ref 21–46)
MAGNESIUM SERPL-MCNC: 1.7 MG/DL (ref 1.6–2.4)
MAGNESIUM SERPL-MCNC: 1.9 MG/DL (ref 1.6–2.4)
MCHC RBC AUTO-ENTMCNC: 32.2 G/DL (ref 31.5–36.5)
MCHC RBC AUTO-ENTMCNC: 32.3 G/DL (ref 31.5–36.5)
MCV RBC AUTO: 89 FL (ref 80–100)
MCV RBC AUTO: 91 FL (ref 80–100)
MONOCYTES # BLD AUTO: 0.47 K/MM3 (ref 0.16–1.47)
MONOCYTES # BLD AUTO: 0.69 K/MM3 (ref 0.16–1.47)
MONOCYTES NFR BLD AUTO: 5 % (ref 4–13)
MONOCYTES NFR BLD AUTO: 7 % (ref 4–13)
NEUTROPHILS # BLD AUTO: 6.95 K/MM3 (ref 1.96–9.15)
NEUTROPHILS # BLD AUTO: 7.89 K/MM3 (ref 1.96–9.15)
NEUTROPHILS NFR BLD AUTO: 77 % (ref 41–73)
NEUTROPHILS NFR BLD AUTO: 81 % (ref 41–73)
NRBC # BLD AUTO: 0 K/MM3 (ref 0–0.02)
NRBC # BLD AUTO: 0 K/MM3 (ref 0–0.02)
NRBC BLD AUTO-RTO: 0 /100 WBC (ref 0–0.2)
NRBC BLD AUTO-RTO: 0 /100 WBC (ref 0–0.2)
PH BLDA: 7.43 [PH] (ref 7.35–7.45)
PHOSPHATE SERPL-MCNC: 1.6 MG/DL (ref 2.5–4.9)
PHOSPHATE SERPL-MCNC: 2.2 MG/DL (ref 2.5–4.9)
PLATELET # BLD AUTO: 210 K/MM3 (ref 150–400)
PLATELET # BLD AUTO: 233 K/MM3 (ref 150–400)
POTASSIUM SERPL-SCNC: 3.1 MMOL/L (ref 3.5–5.5)
POTASSIUM SERPL-SCNC: 3.3 MMOL/L (ref 3.5–5.5)
PROT SERPL-MCNC: 5.6 G/DL (ref 6.4–8.2)
PROT SERPL-MCNC: 5.6 G/DL (ref 6.4–8.2)
SODIUM SERPL-SCNC: 143 MMOL/L (ref 136–145)
SODIUM SERPL-SCNC: 144 MMOL/L (ref 136–145)
TROPONIN I SERPL-MCNC: 0.04 NG/ML (ref 0–0.04)
VANCOMYCIN TROUGH SERPL-MCNC: 11.7 UG/ML (ref 5–10)

## 2020-03-18 NOTE — NUR
PATIENT SLIGHTLY CONFUSED.  PATIENT ORIENTED TO SELF, FAMILY, FOLLOWING
DIRECTIONS.  PATIENT HAS TEMP OF 99.6 DEGREES FAHRENHEIT.  PATIENT SATTING 90%
AND GREATER ON 5 L NC.  PATIENT COUGHING UP LARGE AMOUNTS OF THICK, YELLOW
PHLEGM.  PATIENT IN SR TO ST, HR 80S .  SBP 180S TO 190S.  OG AND TF
DC'D WHEN EXTUBATED.  HANDY NOTED TO HAVE SEDIMENT.  PATIENT'S COUSIN IN ROOM
AND HAS BEEN IN ROOM SINCE EXTUBATED.  NO COMPLAINTS OF PAIN AT THIS TIME.
WILL CONTINUE TO MONITOR.

## 2020-03-18 NOTE — NUR
SHIFT SUMMARY
  PATIENT EXTUBATED AT 1120.  PATIENT HAS REMAINED CALM AND COOPERATIVE SINCE.
 PATIENT EXTUBATED WHILE ON PRECEDEX AS WAS AGITATED.  PRECEDEX DRIP PLACED ON
STANDBY A FEW HOURS AGO AND PATIENT REMAINS CALM AND COOPERATIVE.  PATIENT
MOSTLY ORIENTED.  PATIENT HAD TMAX .7 DEGREES FAHRENHEIT.  PATIENT GIVEN
PRN FENTANYL FOR SIGNS OF PAIN WHILE EXTUBATED.  PATIENT HAS BEEN SATTING 90%
AND GREATER ON 2 L NC AT THIS TIME.  PATIENT CONTINUES COUGHING UP LARGE
AMOUNTS OF THICK, YELLOW SPUTUM.  PATIENT REMAINS IN SR TO ST, HR 80S .
PATIENT HAS BEEN MOSTLY HYPERTENSIVE THIS SHIFT.  PATIENT DOES NOT TAKE ANY
HTN MEDS AT HOME.  PATIENT STARTED ON CLONIDINE PATCH AND LOPRESSOR PO.
PATIENT HAS ALSO BEEN GIVEN PRN LABETALOL NUMEROUS TIMES THIS SHIFT.  NO BM
THIS SHIFT.  PATIENT PASSED SWALLOW EVAL.  ON CLEAR DIET AND TO ADVANCE AS
TOLERATED.  HANDY DRAINED ADEQUATE AMOUNT OF DARK YELLOW/ CLOUDY URINE WITH
SEDIMENT NOTED.  NO CHANGE TO SKIN.  PATIENT REPOSITIONED T/O SHIFT.  NS TKO.
PATIENT RECEIVED REPLACEMENTS FOR MAG, POTASSIUM, AND PHOS THIS AM.  SPUTUM
CULTURE SENT TO LAB.  RUFUS PADRON, IN AND OUT T/O DAY.  PATIENT HAS NO
COMPLAINTS OF PAIN AT THIS TIME.  BED LOW, CALL LIGHT IN REACH.  WILL BE
GIVING REPORT TO ONCOMING NIGHT SHIFT NURSE SHORTLY.

## 2020-03-18 NOTE — NUR
DR. GODOY INFORMED OF SBP 160S DESPITE HAVING CLONIDINE PATCH AND 40 MG PRN
IV LABETALOL GIVEN.   STATED HE WOULD PUT ORDERS IN.

## 2020-03-18 NOTE — NUR
APPROX 2215 PATIENT TURNED ON LEFT SIDE SO STAFF COULD CLEAN HIM, PATIENT
EXPERIENCED WHAT APPEARED TO BE WIDE COMPLEX TACH FOR A BREIF PERIOD. GOSMAN
WAS PRESENT AND ORDERS GIVEN. EKG INDICATED NSR, BLOOD CULTURES AND OTHER LABS
DRAWN, AWAITING RESULTS. PATIENT TEMPERATURE  WAS TREATED PER EMAR AND
PAIN MEDICATIONS WERE GIVEN PER PATIENT REQUEST AND MD ORDERS.
APPROX 2300, PATIENTS TEMERATURE AVERAGING APPROX 100.6, SLEEPING SOUNDLY WITH
NO MORE CARDIAC EPISODES NOTED, MONITORING CLOSELY.

## 2020-03-18 NOTE — NUR
0925- PATIENT DECREASED FROM 55 TO 30 MCG/ KG/ MINUTE OF PROPOFOL.  DR. GODOY
CHANGED PATIENT FROM AC SETTINGS TO SPONTANEOUS PRESSURE SUPPORT 8/5, 30%
FIO2.
 
0932- PROPOFOL PLACED ON STANDBY.
 
0933- 2 MG IV ATIVAN GIVEN FOR INCREASED AGITATION AND PULLING ON RESTRAINTS.
PATIENT INFORMED THAT HE NEEDS TO TRY AND STAY CALM AND RELAXED SO HIS
BREATHING/ OXYGENATION CAN BE ASSESSED AND BREATHING TUBE CAN BE DC'D.
 
0938- PRN IV FENTANYL GIVEN FOR SIGNS OF PAIN.
 
0940- PATIENT STARTED ON PRECEDEX DRIP TO HELP WITH AGITATION.
 
1000- PATIENT GIVEN 2 MG IV ATIVAN TO HELP WITH AGITATION.
 
1015- PATIENT HAS CALMED DOWN AND IS RESTING QUIETLY IN BED.  PATIENT REMAINS
RESPONDING TO VERBAL STIMULI.

## 2020-03-18 NOTE — NUR
DR. GODOY INFORMED OF PATIENT'S CONTINUED HTN DESPITE FREQUENT ADMINISTRATION
OF PRN LABETALOL.  ORDER RECEIVED.

## 2020-03-18 NOTE — NUR
SHIFT SUMMARY :  REMAINS INTUBATED SEDATED AND RESTRAINED. MONITOR INTACT
SHOWING SINUS TACH HEART RATE  80'S. VENT SETTINGS REMAIN AC 20, , PEEP
8, FIO2 50% RATE 20-32 SPO2 95-98% LUNG SOUNDS CLEAR UPPER LOBES WITH
DECREASED SOUNDS IN THE BASES.ABDOMEN SOFT WITH BOWEL SOUNDS FOUR QUADS. HANDY
PATENT DRAINING IFTIKHAR URINE. REMAINS IN SOFT WRIST RESTRAINTS TO PREVENT
INADVERTENT REMOVAL OF LINES/TUBES. HAS PERIODS OF AGITATION  PULLING ON
RESTRAINTS AND REACHING FOR ETT.  SUCTION COUPIOUS AMTS ORAL SECREATION THIN
CLEAR THICK UELLOWISH SECREATIONS SX PER ETT. MEDICATED WITH 100MCGS FENTANYL
FOR AGITATIONS APPROX EVERY TWO HOURS. EYES OCC DEVIATE UPWARDS. WILL RARELY
MAKE EYE CONTACT AND TRACK MOVEMENT CONTINUE TO MONITOR AND REPORT CHANGE IN
PATIENT CONDITION. SCANT AMTS RESIDUAL REFED WITH ORAL CARE.

## 2020-03-18 NOTE — NUR
REPORT OFF
BEDSIDE REPORT GIVEN TO SHE CLEMENT TO ASSUME CARE. ALL QUESTIONS ANSWERED. PT
IS LAYING IN BED AWAKE, ALERT, ORIENTED, AND COOPERATIVE.

## 2020-03-18 NOTE — NUR
PATIENT RESTING QUIETLY IN BED UPON ENTERING ROOM.  PATIENT CALM AND
COOPERATIVE.  PATIENT ALERT AND ORIENTED EXCEPT TO TOWN.  PATIENT KNOWS THAT
HE IS IN THE HOSPITAL AND THAT THE LAST THING HE REMEMBERS WAS BEING IN A HOT
TUB.  PATIENT BELIEVED HE WAS IN [place].  PATIENT HAS CORE TEMP .2
DEGREES FAHRENHEIT.  PATIENT IN SR, HR IN THE 80S.  SBP 170S TO 180S DESPITE
CLONIDINE PATCH AND PRN LABETALOL.  DR. GODOY IS AWARE.  PATIENT SATTING 90%
AND GREATER ON 2 L NC.  PATIENT CONTINUES TO COUGH UP LARGE AMOUNTS OF THICK,
YELLOW SPUTUM.  PRECEDEX ON SB.  PATIENT DENIES PAIN AT THIS TIME.  WILL
CONTINUE TO MONITOR.

## 2020-03-18 NOTE — NUR
INITIAL ASSESSMENT
  PATIENT INTUBATED AND ON SEDATION.  PATIENT OPENS EYES WHEN NURSE SAYS NAME.
 PATIENT FOLLOWING SOME SIMPLE COMMANDS.  PATIENT DOES APPEAR ANXIOUS.
PATIENT GIVEN PRN FENTANYL FOR SIGNS OF PAIN.  PATIENT NOW APPEARS
COMFORTABLE.  + COUGH AND GAG.  TEMP .0 DEGREES FAHRENHEIT.  PATIENT
SATTING 90% AND GREATER ON AC 20, , PEEP 8, FIO2 50%.  LUNGS CLEAR
THROUGHOUT.  MODERATE AMOUNT OF THICK, YELLOW SPUTUM BEING SUCTIONED FROM ETT.
 COPIOUS AMOUNT OF CLEAR, THIN ORAL SECRETIONS.  PATIENT IN SR, HR IN THE 80S.
 SBP 150S TO 160S.  PULSES STRONG.  ABDOMEN SOFT, NONDISTENDED, WITH
NORMOACTIVE BS NOTED.  LAST BM DOCUMENTED YESTERDAY.  PATIENT NPO.  PIVOT 1.5
INFUSING AT GOAL RATE OF 35 MLS/ HOUR WITH 30 ML WATER FLUSH Q4H.  RESIDUAL OF
5 ML OBTAINED AND REINSTILLED.  HANDY DRAINING DARK YELLOW/ CLOUDY URINE.
NON-PITTING EDEMA NOTED TO BILAT HANDS.  PROPOFOL INFUSING AT 55 MCG/ KG/
MINUTE, NS TKO.  PATIENT RECEIVING 2 G MAG AND 20 MM K PHOS FOR REPLACEMENTS
THIS AM.  BED LOW, CALL LIGHT IN REACH.  WILL CONTINUE TO MONITOR PATIENT
FREQUENTLY THROUGHOUT SHIFT.

## 2020-03-19 LAB
ALBUMIN SERPL BCP-MCNC: 2.1 G/DL (ref 3.4–5)
ALBUMIN/GLOB SERPL: 0.6 {RATIO} (ref 0.8–1.8)
ALT SERPL W P-5'-P-CCNC: 71 U/L (ref 12–78)
ANION GAP SERPL CALCULATED.4IONS-SCNC: 5 MMOL/L (ref 6–16)
AST SERPL W P-5'-P-CCNC: 42 U/L (ref 12–37)
BASOPHILS # BLD AUTO: 0.04 K/MM3 (ref 0–0.23)
BASOPHILS NFR BLD AUTO: 1 % (ref 0–2)
BILIRUB SERPL-MCNC: 0.4 MG/DL (ref 0.1–1)
BUN SERPL-MCNC: 9 MG/DL (ref 8–24)
CALCIUM SERPL-MCNC: 8 MG/DL (ref 8.5–10.1)
CHLORIDE SERPL-SCNC: 108 MMOL/L (ref 98–108)
CO2 SERPL-SCNC: 30 MMOL/L (ref 21–32)
CREAT SERPL-MCNC: 0.64 MG/DL (ref 0.6–1.2)
DEPRECATED RDW RBC AUTO: 45.1 FL (ref 35.1–46.3)
EOSINOPHIL # BLD AUTO: 0.22 K/MM3 (ref 0–0.68)
EOSINOPHIL NFR BLD AUTO: 3 % (ref 0–6)
ERYTHROCYTE [DISTWIDTH] IN BLOOD BY AUTOMATED COUNT: 14 % (ref 11.7–14.2)
GLOBULIN SER CALC-MCNC: 3.4 G/DL (ref 2.2–4)
GLUCOSE SERPL-MCNC: 98 MG/DL (ref 70–99)
HCT VFR BLD AUTO: 32.7 % (ref 37–53)
HGB BLD-MCNC: 10.7 G/DL (ref 13.5–17.5)
IMM GRANULOCYTES # BLD AUTO: 0.1 K/MM3 (ref 0–0.1)
IMM GRANULOCYTES NFR BLD AUTO: 1 % (ref 0–1)
LYMPHOCYTES # BLD AUTO: 1.21 K/MM3 (ref 0.84–5.2)
LYMPHOCYTES NFR BLD AUTO: 14 % (ref 21–46)
MAGNESIUM SERPL-MCNC: 2.1 MG/DL (ref 1.6–2.4)
MCHC RBC AUTO-ENTMCNC: 32.7 G/DL (ref 31.5–36.5)
MCV RBC AUTO: 89 FL (ref 80–100)
MONOCYTES # BLD AUTO: 0.81 K/MM3 (ref 0.16–1.47)
MONOCYTES NFR BLD AUTO: 9 % (ref 4–13)
NEUTROPHILS # BLD AUTO: 6.47 K/MM3 (ref 1.96–9.15)
NEUTROPHILS NFR BLD AUTO: 73 % (ref 41–73)
NRBC # BLD AUTO: 0 K/MM3 (ref 0–0.02)
NRBC BLD AUTO-RTO: 0 /100 WBC (ref 0–0.2)
PHOSPHATE SERPL-MCNC: 4.1 MG/DL (ref 2.5–4.9)
PLATELET # BLD AUTO: 245 K/MM3 (ref 150–400)
POTASSIUM SERPL-SCNC: 3.6 MMOL/L (ref 3.5–5.5)
PROT SERPL-MCNC: 5.5 G/DL (ref 6.4–8.2)
SODIUM SERPL-SCNC: 143 MMOL/L (ref 136–145)

## 2020-03-19 NOTE — NUR
PATIENT 1 PA TO [facility].  PATIENT REMAINS PLEASANT AND COOPERATIVE.  PATIENT HAS
TEMPORAL TEMP .8 DEGREES FAHRENHEIT.  PATIENT HAVING LIQUID STOOLS.
PATIENT REMAINS SATTING 90% AND GREATER ON RA AND REMAINS COUGHING UP LARGE
AMOUNTS OF THICK, YELLOW PHLEGM.  PATIENT IN SR, HR IN THE 90S.  SBP 130S TO
160S.  FAMILY AT BEDSIDE VISITING.  NO COMPLAINTS OF PAIN.  NO OTHER ACUTE
CHANGES TO NOTE ON AT THIS TIME.  WILL CONTINUE TO MONITOR.

## 2020-03-19 NOTE — NUR
SHIFT SUMMARY:
PATIENT REMOVED HIS RECTAL PROBE AT APPROX 2120, HANDY PATENT AND DRAINING
WELL, CATAPRESS PATCH STILL IN PLACE ON RIGHT SHOULDER. LAB DRAW AT APPROX
2230 INDICATING LOW ELECTROLYTES. GOSMAN AWARE AND REPLACEMENTS ORDERED AND
ADMINISTERED. TROPONINS NEGATIVE AT THIS TIME, INTERMITTENTS SVT X3
APPROX 5 TO 10 BEATS, VSS, CALL LIGHT WITHIN REACH, BED LOW AND LOCKED. NO
OTHER ISSUES NOTED AT THIS TIME.

## 2020-03-19 NOTE — NUR
PATIENT SLEEPING SOUNDLY UPON ENTERING ROOM.  PATIENT FEBRILE WITH TEMP OF
101.5 DEGREES FAHRENHEIT.  PATIENT GIVEN PRN TYLENOL FOR TEMP.  PATIENT
SATTING 90% AND GREATER ON 2 L NC.  HR IN THE 90S.  SBP IN THE 140S.  NO OTHER
ACUTE CHANGES TO NOTE ON AT THIS TIME.  DENIES PAIN.  WILL CONTINUE TO
MONITOR.

## 2020-03-19 NOTE — NUR
UPDATED DR. GODOY ON PATIENT STATUS.   AWARE OF PATIENT'S ACCELERATED HR
AND VTACH ON NIGHT SHIFT.   INSTRUCTED TO PULL PICC OUT 4 CM.   STATED
TO DC CLONIDINE PATCH AS BP IMPROVED AND HAS SCHEDULED PO HTN MEDS.  INFORMED
 THAT PATIENT HAD TMAX .8 DEGREES FAHRENHEIT ON NIGHT SHIFT.

## 2020-03-19 NOTE — NUR
INITIAL ASSESSMENT
  PATIENT ALERT AND ORIENTED X 4, FORGETFUL ON RARE OCCASION.  PATIENT HAS
TEMP OF 99.8 DEGREES FAHRENHEIT.  PATIENT DENIES PAIN.  PATIENT 1 PERSON
ASSIST. WEAK BUT ABLE TO MOVE ALL EXTREMITIES AND REPOSITION SELF IN BED.
PATIENT SATTING 90% AND GREATER ON RA.  LUNGS CLEAR THROUGHOUT.  PATIENT
CONTINUES TO COUGH UP LARGE AMOUNT OF THICK, YELLOW SPUTUM.  PATIENT IN SR, HR
IN THE 80S.  BP STABLE.  PATIENT HAD RUNS OF VTACH DURING THE NIGHT PER NIGHT
SHIFT NURSE.  PULSES STRONG.  PATIENT HAD LOOSE BM ON NIGHT SHIFT.  ROZINA
DC'D.  URINE DARK IFTIKHAR IN COLOR.  NON-PITTING EDEMA NOTED TO BILAT HANDS.  NS
TKO.  PATIENT RECEIVED BED BATH THIS AM.  BED LOW, CALL LIGHT IN REACH.  WILL
CONTINUE TO MONITOR FREQUENTLY THROUGHOUT SHIFT.

## 2020-03-19 NOTE — NUR
ASSUMED CARE
BEDSIDE REPORT RECIEVED. PT IS AWAKE, ALERT, AND ORIENTED. PT IS CALM,
COOPERATIVE, AND PLEASANT. PT IS FORGETFUL AT TIMES. FOLLOWS DIRECTIONS
APPROPRIATELY. VITAL SIGNS STABLE. PT ON ROOM AIR. PICC TO JUNE C/D/I, SALINE
LOCKED. PT WITH POOR APPETITE, BUT STILL WANTS TO PICK AT [employer] FOR
AWHILE. PT USING URINAL AT BEDSIDE. NO FAMILY PRESENT. WILL CONTINUE TO
MONITOR.

## 2020-03-19 NOTE — NUR
SHIFT SUMMARY
  PATIENT REMAINED ALERT AND ORIENTED X 4, SLIGHTLY FORGETFUL AT TIMES.
PATIENT HAD TMAX .5 DEGREES FAHRENHEIT.  PATIENT GIVEN PRN TYLENOL FOR
TEMP.  PATIENT GIVEN PRN PAIN MED OT FOR COMPLAINT OF PAIN IN TORSO/ STOMACH.
PATIENT UP TO CHAIR WITH PT.  PATIENT 1 PA- STRONG BUT DECONDITIONED AND
UNSTEADY ON FEET.  PATIENT REMAINED SATTING RA TO 2 L NC DURING SHIFT.
PATIENT CONTINUED TO COUGH UP LARGE AMOUNT OF THICK, YELLOW PHLEGM.  PATIENT
REMAINED IN SR, HR 80S TO 90S.  NO RUNS OF VTACH AFTER PICC LINE PULLED OUT 4
CC.  CARDIOLOGY WAS IN TO SEE PATIENT BUT IS NOT CHANGING ANYTHING AT THIS
TIME AND WILL CONTINUE TO MONITOR.  CLONIDINE PATCH DC'D AND REMOVED THIS
SHIFT.  SBP RANGED FROM 1-TEENS TO 160S.  PATIENT HAD MULTIPLE LIQUID STOOLS
THIS SHIFT.  POOR APPETITE.  HANDY DC'D; PATIENT HAD ADEQUATE OUTPUT.  IVS
FLUSHED AND SALINE LOCKED.  PATIENT HAD BED BATH THIS SHIFT.  NO COMPLAINTS AT
THIS TIME.  BED LOW, CALL LIGHT IN REACH.  REPORT GIVEN TO ASSUMING NIGHT
SHIFT NURSE.

## 2020-03-20 LAB
ANION GAP SERPL CALCULATED.4IONS-SCNC: 7 MMOL/L (ref 6–16)
BASOPHILS # BLD AUTO: 0.05 K/MM3 (ref 0–0.23)
BASOPHILS NFR BLD AUTO: 1 % (ref 0–2)
BUN SERPL-MCNC: 16 MG/DL (ref 8–24)
CALCIUM SERPL-MCNC: 8.3 MG/DL (ref 8.5–10.1)
CHLORIDE SERPL-SCNC: 108 MMOL/L (ref 98–108)
CO2 SERPL-SCNC: 25 MMOL/L (ref 21–32)
CREAT SERPL-MCNC: 0.69 MG/DL (ref 0.6–1.2)
DEPRECATED RDW RBC AUTO: 42.9 FL (ref 35.1–46.3)
EOSINOPHIL # BLD AUTO: 0.15 K/MM3 (ref 0–0.68)
EOSINOPHIL NFR BLD AUTO: 2 % (ref 0–6)
ERYTHROCYTE [DISTWIDTH] IN BLOOD BY AUTOMATED COUNT: 13.6 % (ref 11.7–14.2)
GLUCOSE SERPL-MCNC: 99 MG/DL (ref 70–99)
HCT VFR BLD AUTO: 33.8 % (ref 37–53)
HGB BLD-MCNC: 11.1 G/DL (ref 13.5–17.5)
IMM GRANULOCYTES # BLD AUTO: 0.21 K/MM3 (ref 0–0.1)
IMM GRANULOCYTES NFR BLD AUTO: 2 % (ref 0–1)
LYMPHOCYTES # BLD AUTO: 1.52 K/MM3 (ref 0.84–5.2)
LYMPHOCYTES NFR BLD AUTO: 15 % (ref 21–46)
MCHC RBC AUTO-ENTMCNC: 32.8 G/DL (ref 31.5–36.5)
MCV RBC AUTO: 86 FL (ref 80–100)
MONOCYTES # BLD AUTO: 1.37 K/MM3 (ref 0.16–1.47)
MONOCYTES NFR BLD AUTO: 14 % (ref 4–13)
NEUTROPHILS # BLD AUTO: 6.56 K/MM3 (ref 1.96–9.15)
NEUTROPHILS NFR BLD AUTO: 67 % (ref 41–73)
NRBC # BLD AUTO: 0 K/MM3 (ref 0–0.02)
NRBC BLD AUTO-RTO: 0 /100 WBC (ref 0–0.2)
PLATELET # BLD AUTO: 285 K/MM3 (ref 150–400)
POTASSIUM SERPL-SCNC: 3.5 MMOL/L (ref 3.5–5.5)
SODIUM SERPL-SCNC: 140 MMOL/L (ref 136–145)

## 2020-03-20 NOTE — NUR
PATIENT HAD A GOOD DAY. A/OX4, UP INDEPENDENTLY IN ROOM. 15L O2 VIA NRB TO
ATTEMPT TO TREAT PNEUMOTHORAX. CONTINUES TO RUN A LOW GRADE TEMP. TREAT WITH
TYLENOL PRN. VSS THIS SHIFT. SKIN INTACT. TOLERATING REGULAR DIET. 3 LUMEN
PICC TO JUNE, BANANA BAG INFUSING. CALM AND COOPERATIVE WITH CARE, CALLS
APPROPRIATELY FOR ASSISTANCE.

## 2020-03-20 NOTE — NUR
SHIFT SUMMARY
NO ACUTE CHANGES THIS SHIFT. PT HAS REMAINED ALERT AND ORIENTED THROUGHOUT THE
SHIFT. PT AWAKE FOR MOST OF THE SHIFT. VITAL SIGNS HAVE REMAINED STABLE. PT
PLACED ON 2L O2 NC. PT CONTINUES TO HAVE HARSH AND PRODUCTIVE COUGH. PICC
REMAINS C/D/I, SALINE LOCKED. PT TAKING PO INTAKE WELL. PT UP TO BSC
INDEPENDENTLY AND USING URINAL TO VOID INDEPENDENTLY. WILL CONTINUE TO MONITOR
AND REPORT OFF TO ONCOMING RN.

## 2020-03-20 NOTE — NUR
PATIENT TRANSFERRED FROM ICU 4 TO ROOM 312. REPORT RECEIVED FROM FELIBERTO.
PATIENT IS A/OX4, UP INDEPENDENTLY IN ROOM. PATIENT DENIES ANY PAIN OR
DISCOMOFRT. LUNGS CLEAR AND DIM IN THE BASES, ON RA. PATIENT ORIENTED TO ROOM
AND USE OF CALL LIGHT. WILL BE PLACED ON TELE WHEN IT ARRIVES. PATIENT HAS
BEEN RUNNING SR TODAY. DENIES ANY CP OR SOB. TOLERATING HIS REGULAR DIET. PIC
LINE TO UPPER ARM WNL AND SL.

## 2020-03-21 LAB
ANION GAP SERPL CALCULATED.4IONS-SCNC: 5 MMOL/L (ref 6–16)
BUN SERPL-MCNC: 17 MG/DL (ref 8–24)
CALCIUM SERPL-MCNC: 8.5 MG/DL (ref 8.5–10.1)
CHLORIDE SERPL-SCNC: 107 MMOL/L (ref 98–108)
CO2 SERPL-SCNC: 27 MMOL/L (ref 21–32)
CREAT SERPL-MCNC: 0.61 MG/DL (ref 0.6–1.2)
GLUCOSE SERPL-MCNC: 104 MG/DL (ref 70–99)
MAGNESIUM SERPL-MCNC: 2.2 MG/DL (ref 1.6–2.4)
POTASSIUM SERPL-SCNC: 3.3 MMOL/L (ref 3.5–5.5)
SODIUM SERPL-SCNC: 139 MMOL/L (ref 136–145)

## 2020-03-21 NOTE — NUR
PT. LYING IN BED WATCHING TV AND EATING DINNER.  THIS MORNING PT'S HR JUMPED
INTO 'S WHILE UP TO BATHROOM  WITHOUT OXYGEN.  GOT PT. BACK TO BED AND
PUT HIS OXYGEN BACK ON TO SEE IF HE WOULD RECOVER.  PT. DID NOT RECOVER SO DR. GAYLE WAS CALLED BUT WAS UNABLE TO REACH HER.  KEPT PT IN BED AND OXYGEN IN
PLACE. GAVE MORNING MEDS.  NOTIFIED BY MONITOR TECH PT. HAD CONVERTED TO AFIB
AT 0945.  TRIED TO NOTIFY DR. GAYLE WENT TO [employer], LEFT MESSAGE.  CHARGE
RN FINALLY GOT HOLD OF HER AT 1145 AND  SAID SHE WOULD LOOK AT HIS CHART
AND PUT ORDERS IN.  PT. HEART RATE IS  NOW BUT HE IS STILL IN AFIB.
DENIES PAIN OR SOB, REBREATHER STILL IN PLACE.

## 2020-03-21 NOTE — NUR
SHIFT SUMMARY:
40 Y/O MALE RESTED COMFORTABLY IN BED; PT WORE NON REBREATHER MASK 50% OF
NIGHT WITH NURSING STAFF ENCOURAGING PATIENT TO WEAR WITH MINIMAL EFFECT
NOTED; PT NON RECEIPTIVE AT TIMES TO TEACHING BY STAFF AT HE VOICED HE IS NOT
INTERESTED IN LEARNING AT THIS TIME AND IS ANXIOUS TO JUST BE DISCHARGED HOME
SOON; DENIES PAIN OR NAUSEA; UP PER SELF IN ROOM WITHOUT ISSUE; BED LOW
POSITION WITH CALL LIGHT AT SIDE.

## 2020-03-21 NOTE — NUR
PT AT BEGINNING SHIFT WAS NOTED TO NOT BE WEARING NON REBREATHER MASK ON
REGULAR BASIS; THIS NURSE ENCOURAGED PATIENT TO WEAR DEVICE DUE TO CURRENT
HEALTH ISSUES WITH ACKNOWLEDGEMENT NOTED.
 
2230 PATIENT IS WEARING NON REBREATHER MASK AS ORDERED WHILE RESTING IN BED.

## 2020-03-22 LAB
ALBUMIN SERPL BCP-MCNC: 2.4 G/DL (ref 3.4–5)
ANION GAP SERPL CALCULATED.4IONS-SCNC: 5 MMOL/L (ref 6–16)
BUN SERPL-MCNC: 23 MG/DL (ref 8–24)
CALCIUM SERPL-MCNC: 8.7 MG/DL (ref 8.5–10.1)
CHLORIDE SERPL-SCNC: 107 MMOL/L (ref 98–108)
CO2 SERPL-SCNC: 26 MMOL/L (ref 21–32)
CREAT SERPL-MCNC: 0.59 MG/DL (ref 0.6–1.2)
GLUCOSE SERPL-MCNC: 103 MG/DL (ref 70–99)
PHOSPHATE SERPL-MCNC: 3.8 MG/DL (ref 2.5–4.9)
POTASSIUM SERPL-SCNC: 4 MMOL/L (ref 3.5–5.5)
SODIUM SERPL-SCNC: 138 MMOL/L (ref 136–145)

## 2020-03-22 NOTE — NUR
PT. LYING QUIETLY.  STILL USING NON-REBREATHER AT 15 LITERS A MINUTE.  PT. IS
STILL IN THE 'S AND A-FIB.  DOES JUMP UP INTO -150'S WHEN UP
MOVING AROUND.

## 2020-03-22 NOTE — NUR
SHIFT SUMMARY:
40 Y/O MALE RESTED COMFORTABLY ALL SHIFT; PT HAD ONE EPISODE OF
TACHYCARDIA--140 WHILE UP WALKING ALMANZA X 1 WITH CNA WHICH RESOLVED IMMEDIATELY
WITH REST  PER TELEMETRY TECH--ZIGGY; HAPPY AND COOPERATIVE; DENIES
PAIN OR NAUSEA; BED LOW POSITION WITH CALL LIGHT AT SIDE.

## 2020-03-23 NOTE — NUR
SHIFT SUMMARY-
PT A/OX4, INDEP IN ROOM. PT DENIES ANY COMPLAINTS T/O THE DAY. LS CLEAR, 02
TITRATED FROM 4.5 N/C TO 3L N/C TODAY. TELE AFIB, RATE UP 'S THIS AM
WHILE WORKING PT OT BUT HAS BEEN MANAGED THIS AFTERNOON, RATE AT 91 AT 1430.
NO OTHER ACUTE CHANGES THIS SHIFT. POSSIBLE D/C HOME TOMORROW.

## 2020-03-23 NOTE — NUR
PT was on 10 l nonrebreather but intermittantly takes oxygen off and found on
room air with sat 88 to 97 at rest. Tachy when off oxygen totally. Afib since
yesterday rate 103 to 150 with activity. PT takes oxygen off despite having
extensions to reach bathroom. PT positive for METH and Heroin on admission.
discussed substance aduse and need for cessation. anxious at times.

## 2020-03-24 LAB
ALBUMIN SERPL BCP-MCNC: 2.8 G/DL (ref 3.4–5)
ANION GAP SERPL CALCULATED.4IONS-SCNC: 8 MMOL/L (ref 6–16)
BUN SERPL-MCNC: 26 MG/DL (ref 8–24)
CALCIUM SERPL-MCNC: 9.1 MG/DL (ref 8.5–10.1)
CHLORIDE SERPL-SCNC: 104 MMOL/L (ref 98–108)
CO2 SERPL-SCNC: 26 MMOL/L (ref 21–32)
CREAT SERPL-MCNC: 0.77 MG/DL (ref 0.6–1.2)
GLUCOSE SERPL-MCNC: 94 MG/DL (ref 70–99)
PHOSPHATE SERPL-MCNC: 4.5 MG/DL (ref 2.5–4.9)
POTASSIUM SERPL-SCNC: 4.7 MMOL/L (ref 3.5–5.5)
SODIUM SERPL-SCNC: 138 MMOL/L (ref 136–145)

## 2020-03-24 NOTE — NUR
DISCHARGE
 
PT EDUCATED ON AND RECEIVED PRINTED DISCHARGE INSTRUCTIONS AND VERBALIZED AN
UNDERSTANDING. PT VERBALIZED HE WILL ESTABLISH WITH A PCP IN THE [place]
AREA. RX CALLED TO [facility] IN [facility] PER PT REQUEST. PICC LINE DC'D BY
CHARGE RN. PT LEFT WITH ALL PERSONAL BELONGINGS. PICKED UP BY FAMILY MEMBER.

## 2020-03-24 NOTE — NUR
weaned PT to room air with sats 88 to 93 percent. continues on tele with afib
with controlled rate until he gets up to ambulate to bathroom this AM then
pulse 150 , recovers quickley to 78. Encouraged cessation of substance abuse.
verbalized effect of meth on cardia and pulmonary. Hoping to dc home. Says he
can purchase pulse oxyimetryif needed. encouraged purse lip breathing able to
demonstrate technique. Denies acute distress.